# Patient Record
Sex: FEMALE | Race: WHITE | NOT HISPANIC OR LATINO
[De-identification: names, ages, dates, MRNs, and addresses within clinical notes are randomized per-mention and may not be internally consistent; named-entity substitution may affect disease eponyms.]

---

## 2024-08-19 ENCOUNTER — TRANSCRIPTION ENCOUNTER (OUTPATIENT)
Age: 64
End: 2024-08-19

## 2024-08-23 PROBLEM — Z00.00 ENCOUNTER FOR PREVENTIVE HEALTH EXAMINATION: Status: ACTIVE | Noted: 2024-08-23

## 2024-08-30 PROBLEM — Z86.59 HISTORY OF ANXIETY: Status: RESOLVED | Noted: 2024-08-30 | Resolved: 2024-08-30

## 2024-08-30 PROBLEM — E83.51 HYPOCALCEMIA: Status: RESOLVED | Noted: 2024-08-30 | Resolved: 2024-08-30

## 2024-08-30 PROBLEM — T56.891A LITHIUM TOXICITY: Status: RESOLVED | Noted: 2024-08-30 | Resolved: 2024-08-30

## 2024-08-30 PROBLEM — E83.39 HYPOPHOSPHATEMIA: Status: RESOLVED | Noted: 2024-08-30 | Resolved: 2024-08-30

## 2024-08-30 PROBLEM — Z86.39 HISTORY OF HYPOKALEMIA: Status: RESOLVED | Noted: 2024-08-30 | Resolved: 2024-08-30

## 2024-08-30 PROBLEM — Z86.69 HISTORY OF BENIGN ESSENTIAL TREMOR: Status: RESOLVED | Noted: 2024-08-30 | Resolved: 2024-08-30

## 2024-08-30 PROBLEM — Z86.39 HISTORY OF HYPERLIPIDEMIA: Status: RESOLVED | Noted: 2024-08-30 | Resolved: 2024-08-30

## 2024-09-05 ENCOUNTER — APPOINTMENT (OUTPATIENT)
Dept: NEPHROLOGY | Facility: CLINIC | Age: 64
End: 2024-09-05
Payer: MEDICARE

## 2024-09-05 VITALS
BODY MASS INDEX: 22.66 KG/M2 | HEART RATE: 72 BPM | OXYGEN SATURATION: 98 % | HEIGHT: 61 IN | DIASTOLIC BLOOD PRESSURE: 68 MMHG | WEIGHT: 120 LBS | SYSTOLIC BLOOD PRESSURE: 126 MMHG

## 2024-09-05 DIAGNOSIS — T56.891A TOXIC EFFECT OF OTHER METALS, ACCIDENTAL (UNINTENTIONAL), INITIAL ENCOUNTER: ICD-10-CM

## 2024-09-05 DIAGNOSIS — Z86.39 PERSONAL HISTORY OF OTHER ENDOCRINE, NUTRITIONAL AND METABOLIC DISEASE: ICD-10-CM

## 2024-09-05 DIAGNOSIS — E83.39 OTHER DISORDERS OF PHOSPHORUS METABOLISM: ICD-10-CM

## 2024-09-05 DIAGNOSIS — Z86.59 PERSONAL HISTORY OF OTHER MENTAL AND BEHAVIORAL DISORDERS: ICD-10-CM

## 2024-09-05 DIAGNOSIS — E83.51 HYPOCALCEMIA: ICD-10-CM

## 2024-09-05 DIAGNOSIS — Z86.69 PERSONAL HISTORY OF OTHER DISEASES OF THE NERVOUS SYSTEM AND SENSE ORGANS: ICD-10-CM

## 2024-09-05 DIAGNOSIS — N17.9 ACUTE KIDNEY FAILURE, UNSPECIFIED: ICD-10-CM

## 2024-09-05 PROCEDURE — G2211 COMPLEX E/M VISIT ADD ON: CPT

## 2024-09-05 PROCEDURE — 99214 OFFICE O/P EST MOD 30 MIN: CPT

## 2024-09-07 PROBLEM — Z86.59 HISTORY OF ANXIETY: Status: RESOLVED | Noted: 2024-08-30 | Resolved: 2024-09-07

## 2024-09-07 PROBLEM — Z86.69 HISTORY OF BENIGN ESSENTIAL TREMOR: Status: RESOLVED | Noted: 2024-08-30 | Resolved: 2024-09-07

## 2024-09-07 PROBLEM — T56.891A LITHIUM TOXICITY: Status: ACTIVE | Noted: 2024-08-30

## 2024-09-07 PROBLEM — E83.51 HYPOCALCEMIA: Status: RESOLVED | Noted: 2024-08-30 | Resolved: 2024-09-07

## 2024-09-07 PROBLEM — E83.39 HYPOPHOSPHATEMIA: Status: RESOLVED | Noted: 2024-08-30 | Resolved: 2024-09-07

## 2024-09-07 PROBLEM — N17.9 AKI (ACUTE KIDNEY INJURY): Status: ACTIVE | Noted: 2024-09-07

## 2024-09-07 PROBLEM — Z86.39 HISTORY OF HYPOKALEMIA: Status: RESOLVED | Noted: 2024-08-30 | Resolved: 2024-09-07

## 2024-09-07 PROBLEM — Z86.39 HISTORY OF HYPERLIPIDEMIA: Status: RESOLVED | Noted: 2024-08-30 | Resolved: 2024-09-07

## 2024-09-07 RX ORDER — CLONAZEPAM 2 MG/1
TABLET ORAL
Refills: 0 | Status: ACTIVE | COMMUNITY

## 2024-09-07 RX ORDER — ATORVASTATIN CALCIUM 80 MG/1
TABLET, FILM COATED ORAL
Refills: 0 | Status: ACTIVE | COMMUNITY

## 2024-09-07 RX ORDER — PRIMIDONE 250 MG/1
250 TABLET ORAL
Refills: 0 | Status: ACTIVE | COMMUNITY

## 2024-09-07 RX ORDER — LAMOTRIGINE 200 MG/1
200 TABLET ORAL TWICE DAILY
Refills: 0 | Status: ACTIVE | COMMUNITY

## 2024-09-07 RX ORDER — BUPROPION HYDROCHLORIDE 100 MG/1
TABLET, FILM COATED ORAL
Refills: 0 | Status: ACTIVE | COMMUNITY

## 2024-09-07 NOTE — ASSESSMENT
[FreeTextEntry1] : 1.MERON : Pt with MERON during recent hospitalization secondary to acute lithium toxicity. Pt was recently admitted at [facility] (8/16/24-8/19/24) for worsening weakness, decreased appetite, increased anxiety, tremors, and also intermittent diarrhea for the past 2 weeks. Initial labs in ER concerning for serum lithium levels elevated at 3.4 with serum creatinine WNl at 0.81. Pt underwent urgent HD on 8/16 ~4 hrs via RIJ non tunneled HD cath in view of lithium toxicity. Repeat Serum lithium level is stable at 0.9 (8/17). HD catheter removed on 8/17. Recent Scr was stable at 0.78 on 8/25/24. UA done on 8/16/24 was WNL. Recent serum lithium level was WNL at 0.7 on 8/24/24. Advised to drink plenty of fluids ~2-3 L per day. Advised patient to avoid NSAIDs, RCAs, and other nephrotoxins. Monitor renal function.  Follow up as needed.

## 2024-09-07 NOTE — HISTORY OF PRESENT ILLNESS
[FreeTextEntry1] : Pt is a 63-year-old female with history of HLD, bipolar disorder, and anxiety who came to the clinic for the initial evaluation post hospitalization.  Pt was recently admitted at [facility] (8/16/24-8/19/24) for worsening weakness, decreased appetite, increased anxiety, tremors, and also intermittent diarrhea for the past 2 weeks. Per daughter, Pt was taking Lithium 300 mg BID and it was increased to 300 mg in morning and 600 mg in evening by her psychiatrist in June 2024. Initial labs in ER concerning for serum lithium levels elevated at 3.4 with serum creatinine WNl at 0.81. Toxicology team was consulted who recommended dialysis x 6 hours. Pt underwent urgent HD on 8/16 ~4 hrs via RIJ non tunneled HD cath. Repeat Serum lithium level is stable at 0.9 (8/17). HD catheter removed on 8/17. Pt says once discharged her psychiatrist resumed Lithium again at low doses. Lithium was discontinued after 2 days as pt start developing abdominal pain and weakness. Currently on Lamotrigine and wellbutrin. Recent Scr was stable at 0.78 on 8/25/24. UA done on 8/16/24 was WNL. Recent serum lithium level was WNL at 0.7 on 8/24/24. Pt. Denies history of kidney disease or kidney stones in the past. No history of kidney disease in family. Denies recent use of NSAIDs/ motrin recently.   Pt. currently feels well. Pt. endorses intermittent tremors. Pt says her appetite is improving. Denies any chest pain, SOB, nausea, dysuria, weakness.

## 2024-09-07 NOTE — PHYSICAL EXAM
[General Appearance - Alert] : alert [General Appearance - Well Nourished] : well nourished [General Appearance - In No Acute Distress] : in no acute distress [Sclera] : the sclera and conjunctiva were normal [Outer Ear] : the ears and nose were normal in appearance [Jugular Venous Distention Increased] : there was no jugular-venous distention [Auscultation Breath Sounds / Voice Sounds] : lungs were clear to auscultation bilaterally [Heart Sounds] : normal S1 and S2 [Heart Sounds Gallop] : no gallops [Edema] : there was no peripheral edema [Abdomen Soft] : soft [Bowel Sounds] : normal bowel sounds [No CVA Tenderness] : no ~M costovertebral angle tenderness [Nail Clubbing] : no clubbing  or cyanosis of the fingernails [] : no rash [No Focal Deficits] : no focal deficits [Oriented To Time, Place, And Person] : oriented to person, place, and time [Affect] : the affect was normal

## 2024-09-07 NOTE — REVIEW OF SYSTEMS
[As Noted in HPI] : as noted in HPI [Fever] : no fever [Chills] : no chills [Feeling Poorly] : not feeling poorly [Dry Eyes] : no dryness of the eyes [Sore Throat] : no sore throat [Chest Pain] : no chest pain [Palpitations] : no palpitations [Lower Ext Edema] : no lower extremity edema [Cough] : no cough [SOB on Exertion] : no shortness of breath during exertion [Abdominal Pain] : no abdominal pain [Dysuria] : no dysuria [Itching] : no itching

## 2025-06-04 ENCOUNTER — NON-APPOINTMENT (OUTPATIENT)
Age: 65
End: 2025-06-04

## 2025-06-06 ENCOUNTER — APPOINTMENT (OUTPATIENT)
Dept: NEUROLOGY | Facility: CLINIC | Age: 65
End: 2025-06-06
Payer: MEDICARE

## 2025-06-06 ENCOUNTER — NON-APPOINTMENT (OUTPATIENT)
Age: 65
End: 2025-06-06

## 2025-06-06 VITALS
WEIGHT: 119 LBS | SYSTOLIC BLOOD PRESSURE: 122 MMHG | HEART RATE: 79 BPM | OXYGEN SATURATION: 95 % | BODY MASS INDEX: 22.48 KG/M2 | DIASTOLIC BLOOD PRESSURE: 75 MMHG

## 2025-06-06 PROCEDURE — 99204 OFFICE O/P NEW MOD 45 MIN: CPT

## 2025-06-06 RX ORDER — QUETIAPINE FUMARATE 200 MG/1
200 TABLET ORAL
Refills: 0 | Status: ACTIVE | COMMUNITY

## 2025-08-26 ENCOUNTER — OFFICE (OUTPATIENT)
Dept: URBAN - METROPOLITAN AREA CLINIC 122 | Facility: CLINIC | Age: 65
Setting detail: OPHTHALMOLOGY
End: 2025-08-26
Payer: MEDICARE

## 2025-08-26 DIAGNOSIS — H25.13: ICD-10-CM

## 2025-08-26 DIAGNOSIS — H40.013: ICD-10-CM

## 2025-08-26 DIAGNOSIS — H40.033: ICD-10-CM

## 2025-08-26 PROCEDURE — 92083 EXTENDED VISUAL FIELD XM: CPT | Performed by: OPHTHALMOLOGY

## 2025-08-26 PROCEDURE — 92004 COMPRE OPH EXAM NEW PT 1/>: CPT | Performed by: OPHTHALMOLOGY

## 2025-08-26 PROCEDURE — 76514 ECHO EXAM OF EYE THICKNESS: CPT | Performed by: OPHTHALMOLOGY

## 2025-08-26 PROCEDURE — 92133 CPTRZD OPH DX IMG PST SGM ON: CPT | Performed by: OPHTHALMOLOGY

## 2025-08-26 PROCEDURE — 92020 GONIOSCOPY: CPT | Performed by: OPHTHALMOLOGY

## 2025-08-26 ASSESSMENT — PACHYMETRY
OD_CT_CORRECTION: 3
OD_CT_UM: 503
OS_CT_UM: 515
OS_CT_CORRECTION: 2

## 2025-08-26 ASSESSMENT — VISUAL ACUITY
OS_BCVA: 20/20-
OD_BCVA: 20/25

## 2025-08-26 ASSESSMENT — REFRACTION_AUTOREFRACTION
OD_CYLINDER: -0.50
OS_SPHERE: -1.00
OD_SPHERE: -1.00
OS_CYLINDER: -0.25
OS_AXIS: 119
OD_AXIS: 70

## 2025-08-26 ASSESSMENT — TONOMETRY
OS_IOP_MMHG: 12
OD_IOP_MMHG: 12